# Patient Record
Sex: MALE | Race: WHITE | URBAN - METROPOLITAN AREA
[De-identification: names, ages, dates, MRNs, and addresses within clinical notes are randomized per-mention and may not be internally consistent; named-entity substitution may affect disease eponyms.]

---

## 2021-06-02 ENCOUNTER — OFFICE VISIT (OUTPATIENT)
Dept: URBAN - METROPOLITAN AREA CLINIC 43 | Facility: CLINIC | Age: 21
End: 2021-06-02
Payer: OTHER GOVERNMENT

## 2021-06-02 DIAGNOSIS — H11.132 CONJUNCTIVAL PIGMENTATIONS, LEFT EYE: Primary | ICD-10-CM

## 2021-06-02 PROCEDURE — 99204 OFFICE O/P NEW MOD 45 MIN: CPT | Performed by: OPTOMETRIST

## 2021-06-02 ASSESSMENT — VISUAL ACUITY
OS: 20/20
OD: 20/20

## 2021-06-02 ASSESSMENT — INTRAOCULAR PRESSURE
OD: 15
OS: 15

## 2021-06-02 ASSESSMENT — KERATOMETRY
OS: 39.88
OD: 39.88

## 2021-06-02 NOTE — IMPRESSION/PLAN
Impression: Conjunctival pigmentations, left eye: H11.132.  Plan: melanosis, pt is of darker complexion, no malignant features, monitor annually